# Patient Record
Sex: MALE | Race: BLACK OR AFRICAN AMERICAN | URBAN - METROPOLITAN AREA
[De-identification: names, ages, dates, MRNs, and addresses within clinical notes are randomized per-mention and may not be internally consistent; named-entity substitution may affect disease eponyms.]

---

## 2022-02-21 ENCOUNTER — HOSPITAL ENCOUNTER (INPATIENT)
Age: 62
LOS: 1 days | Discharge: HOME | DRG: 280 | End: 2022-02-22
Attending: EMERGENCY MEDICINE | Admitting: INTERNAL MEDICINE
Payer: OTHER GOVERNMENT

## 2022-02-21 ENCOUNTER — APPOINTMENT (OUTPATIENT)
Dept: GENERAL RADIOLOGY | Age: 62
DRG: 280 | End: 2022-02-21
Payer: OTHER GOVERNMENT

## 2022-02-21 DIAGNOSIS — R09.02 HYPOXIA: ICD-10-CM

## 2022-02-21 DIAGNOSIS — R07.9 CHEST PAIN, UNSPECIFIED TYPE: Primary | ICD-10-CM

## 2022-02-21 DIAGNOSIS — N18.6 ESRD (END STAGE RENAL DISEASE) (HCC): ICD-10-CM

## 2022-02-21 PROBLEM — J81.0 PULMONARY EDEMA, ACUTE (HCC): Status: ACTIVE | Noted: 2022-02-21

## 2022-02-21 LAB
AMPHETAMINE SCREEN, URINE: ABNORMAL
ANION GAP SERPL CALCULATED.3IONS-SCNC: 18 MMOL/L (ref 3–16)
BARBITURATE SCREEN URINE: ABNORMAL
BASE EXCESS VENOUS: -0.1 MMOL/L (ref -2–3)
BASOPHILS ABSOLUTE: 0.1 K/UL (ref 0–0.2)
BASOPHILS RELATIVE PERCENT: 0.8 %
BENZODIAZEPINE SCREEN, URINE: ABNORMAL
BUN BLDV-MCNC: 52 MG/DL (ref 7–20)
CALCIUM SERPL-MCNC: 9.8 MG/DL (ref 8.3–10.6)
CANNABINOID SCREEN URINE: ABNORMAL
CARBOXYHEMOGLOBIN: 1.3 % (ref 0–1.5)
CHLORIDE BLD-SCNC: 99 MMOL/L (ref 99–110)
CO2: 22 MMOL/L (ref 21–32)
COCAINE METABOLITE SCREEN URINE: ABNORMAL
CREAT SERPL-MCNC: 9.3 MG/DL (ref 0.8–1.3)
EKG ATRIAL RATE: 107 BPM
EKG DIAGNOSIS: NORMAL
EKG P AXIS: 56 DEGREES
EKG P-R INTERVAL: 176 MS
EKG Q-T INTERVAL: 346 MS
EKG QRS DURATION: 102 MS
EKG QTC CALCULATION (BAZETT): 461 MS
EKG R AXIS: 32 DEGREES
EKG T AXIS: -21 DEGREES
EKG VENTRICULAR RATE: 107 BPM
EOSINOPHILS ABSOLUTE: 0.4 K/UL (ref 0–0.6)
EOSINOPHILS RELATIVE PERCENT: 2.8 %
GFR AFRICAN AMERICAN: 7
GFR NON-AFRICAN AMERICAN: 6
GLUCOSE BLD-MCNC: 114 MG/DL (ref 70–99)
HCO3 VENOUS: 24.6 MMOL/L (ref 24–28)
HCT VFR BLD CALC: 30.1 % (ref 40.5–52.5)
HEMOGLOBIN, VEN, REDUCED: 3 %
HEMOGLOBIN: 9.7 G/DL (ref 13.5–17.5)
HEPATITIS B SURFACE ANTIGEN INTERPRETATION: NORMAL
LYMPHOCYTES ABSOLUTE: 2.3 K/UL (ref 1–5.1)
LYMPHOCYTES RELATIVE PERCENT: 15.9 %
Lab: ABNORMAL
MAGNESIUM: 2.3 MG/DL (ref 1.8–2.4)
MCH RBC QN AUTO: 30.2 PG (ref 26–34)
MCHC RBC AUTO-ENTMCNC: 32.3 G/DL (ref 31–36)
MCV RBC AUTO: 93.5 FL (ref 80–100)
METHADONE SCREEN, URINE: ABNORMAL
METHEMOGLOBIN VENOUS: 0.3 % (ref 0–1.5)
MONOCYTES ABSOLUTE: 1.6 K/UL (ref 0–1.3)
MONOCYTES RELATIVE PERCENT: 11 %
NEUTROPHILS ABSOLUTE: 10.1 K/UL (ref 1.7–7.7)
NEUTROPHILS RELATIVE PERCENT: 69.5 %
O2 SAT, VEN: 97 %
OPIATE SCREEN URINE: ABNORMAL
OXYCODONE URINE: POSITIVE
PCO2, VEN: 38.9 MMHG (ref 41–51)
PDW BLD-RTO: 16.3 % (ref 12.4–15.4)
PH UA: 7
PH VENOUS: 7.41 (ref 7.35–7.45)
PHENCYCLIDINE SCREEN URINE: ABNORMAL
PHOSPHORUS: 5.8 MG/DL (ref 2.5–4.9)
PLATELET # BLD: 346 K/UL (ref 135–450)
PMV BLD AUTO: 7.5 FL (ref 5–10.5)
PO2, VEN: 85.5 MMHG (ref 25–40)
POTASSIUM SERPL-SCNC: 4.9 MMOL/L (ref 3.5–5.1)
PRO-BNP: 7182 PG/ML (ref 0–124)
PROPOXYPHENE SCREEN: ABNORMAL
RBC # BLD: 3.22 M/UL (ref 4.2–5.9)
SODIUM BLD-SCNC: 139 MMOL/L (ref 136–145)
TCO2 CALC VENOUS: 26 MMOL/L
TROPONIN: 0.12 NG/ML
TROPONIN: 0.13 NG/ML
WBC # BLD: 14.6 K/UL (ref 4–11)

## 2022-02-21 PROCEDURE — 83880 ASSAY OF NATRIURETIC PEPTIDE: CPT

## 2022-02-21 PROCEDURE — 94761 N-INVAS EAR/PLS OXIMETRY MLT: CPT

## 2022-02-21 PROCEDURE — 84100 ASSAY OF PHOSPHORUS: CPT

## 2022-02-21 PROCEDURE — 71045 X-RAY EXAM CHEST 1 VIEW: CPT

## 2022-02-21 PROCEDURE — 6370000000 HC RX 637 (ALT 250 FOR IP)

## 2022-02-21 PROCEDURE — 2580000003 HC RX 258: Performed by: INTERNAL MEDICINE

## 2022-02-21 PROCEDURE — 83735 ASSAY OF MAGNESIUM: CPT

## 2022-02-21 PROCEDURE — 99223 1ST HOSP IP/OBS HIGH 75: CPT | Performed by: INTERNAL MEDICINE

## 2022-02-21 PROCEDURE — 93005 ELECTROCARDIOGRAM TRACING: CPT | Performed by: EMERGENCY MEDICINE

## 2022-02-21 PROCEDURE — 80307 DRUG TEST PRSMV CHEM ANLYZR: CPT

## 2022-02-21 PROCEDURE — 85025 COMPLETE CBC W/AUTO DIFF WBC: CPT

## 2022-02-21 PROCEDURE — 90935 HEMODIALYSIS ONE EVALUATION: CPT

## 2022-02-21 PROCEDURE — C9113 INJ PANTOPRAZOLE SODIUM, VIA: HCPCS | Performed by: INTERNAL MEDICINE

## 2022-02-21 PROCEDURE — 5A1D70Z PERFORMANCE OF URINARY FILTRATION, INTERMITTENT, LESS THAN 6 HOURS PER DAY: ICD-10-PCS | Performed by: INTERNAL MEDICINE

## 2022-02-21 PROCEDURE — 6360000002 HC RX W HCPCS: Performed by: INTERNAL MEDICINE

## 2022-02-21 PROCEDURE — 87340 HEPATITIS B SURFACE AG IA: CPT

## 2022-02-21 PROCEDURE — 80048 BASIC METABOLIC PNL TOTAL CA: CPT

## 2022-02-21 PROCEDURE — 2700000000 HC OXYGEN THERAPY PER DAY

## 2022-02-21 PROCEDURE — 6370000000 HC RX 637 (ALT 250 FOR IP): Performed by: INTERNAL MEDICINE

## 2022-02-21 PROCEDURE — 96375 TX/PRO/DX INJ NEW DRUG ADDON: CPT

## 2022-02-21 PROCEDURE — 82803 BLOOD GASES ANY COMBINATION: CPT

## 2022-02-21 PROCEDURE — 94660 CPAP INITIATION&MGMT: CPT

## 2022-02-21 PROCEDURE — 84484 ASSAY OF TROPONIN QUANT: CPT

## 2022-02-21 PROCEDURE — 6360000002 HC RX W HCPCS: Performed by: EMERGENCY MEDICINE

## 2022-02-21 PROCEDURE — 99285 EMERGENCY DEPT VISIT HI MDM: CPT

## 2022-02-21 PROCEDURE — 2500000003 HC RX 250 WO HCPCS: Performed by: EMERGENCY MEDICINE

## 2022-02-21 PROCEDURE — 2060000000 HC ICU INTERMEDIATE R&B

## 2022-02-21 PROCEDURE — 96374 THER/PROPH/DIAG INJ IV PUSH: CPT

## 2022-02-21 RX ORDER — ACETAMINOPHEN 325 MG/1
650 TABLET ORAL EVERY 6 HOURS PRN
Status: DISCONTINUED | OUTPATIENT
Start: 2022-02-21 | End: 2022-02-22 | Stop reason: HOSPADM

## 2022-02-21 RX ORDER — NITROGLYCERIN 0.4 MG/1
0.4 TABLET SUBLINGUAL EVERY 5 MIN PRN
COMMUNITY

## 2022-02-21 RX ORDER — HYDRALAZINE HYDROCHLORIDE 100 MG/1
100 TABLET, FILM COATED ORAL EVERY 8 HOURS SCHEDULED
Status: DISCONTINUED | OUTPATIENT
Start: 2022-02-21 | End: 2022-02-22 | Stop reason: HOSPADM

## 2022-02-21 RX ORDER — CARVEDILOL 25 MG/1
25 TABLET ORAL 2 TIMES DAILY WITH MEALS
Status: DISCONTINUED | OUTPATIENT
Start: 2022-02-21 | End: 2022-02-22 | Stop reason: HOSPADM

## 2022-02-21 RX ORDER — CLOPIDOGREL BISULFATE 75 MG/1
75 TABLET ORAL DAILY
COMMUNITY

## 2022-02-21 RX ORDER — ASPIRIN 81 MG/1
81 TABLET ORAL DAILY
Status: DISCONTINUED | OUTPATIENT
Start: 2022-02-21 | End: 2022-02-22 | Stop reason: HOSPADM

## 2022-02-21 RX ORDER — CLOPIDOGREL BISULFATE 75 MG/1
75 TABLET ORAL DAILY
Status: DISCONTINUED | OUTPATIENT
Start: 2022-02-21 | End: 2022-02-22 | Stop reason: HOSPADM

## 2022-02-21 RX ORDER — ONDANSETRON 4 MG/1
4 TABLET, ORALLY DISINTEGRATING ORAL EVERY 8 HOURS PRN
Status: DISCONTINUED | OUTPATIENT
Start: 2022-02-21 | End: 2022-02-22 | Stop reason: HOSPADM

## 2022-02-21 RX ORDER — HYDRALAZINE HYDROCHLORIDE 100 MG/1
100 TABLET, FILM COATED ORAL 3 TIMES DAILY
COMMUNITY

## 2022-02-21 RX ORDER — SODIUM CHLORIDE 9 MG/ML
25 INJECTION, SOLUTION INTRAVENOUS PRN
Status: CANCELLED | OUTPATIENT
Start: 2022-02-21

## 2022-02-21 RX ORDER — ONDANSETRON 2 MG/ML
4 INJECTION INTRAMUSCULAR; INTRAVENOUS EVERY 6 HOURS PRN
Status: DISCONTINUED | OUTPATIENT
Start: 2022-02-21 | End: 2022-02-22 | Stop reason: HOSPADM

## 2022-02-21 RX ORDER — NITROGLYCERIN 20 MG/100ML
5-200 INJECTION INTRAVENOUS CONTINUOUS
Status: DISCONTINUED | OUTPATIENT
Start: 2022-02-21 | End: 2022-02-21

## 2022-02-21 RX ORDER — TAMSULOSIN HYDROCHLORIDE 0.4 MG/1
0.8 CAPSULE ORAL DAILY
Status: DISCONTINUED | OUTPATIENT
Start: 2022-02-21 | End: 2022-02-22 | Stop reason: HOSPADM

## 2022-02-21 RX ORDER — ACETAMINOPHEN 650 MG/1
650 SUPPOSITORY RECTAL EVERY 6 HOURS PRN
Status: CANCELLED | OUTPATIENT
Start: 2022-02-21

## 2022-02-21 RX ORDER — GUAIFENESIN 600 MG/1
1200 TABLET, EXTENDED RELEASE ORAL 2 TIMES DAILY
COMMUNITY

## 2022-02-21 RX ORDER — MULTIVITAMIN WITH IRON
1 TABLET ORAL DAILY
COMMUNITY

## 2022-02-21 RX ORDER — ACETAMINOPHEN 500 MG
1000 TABLET ORAL EVERY 8 HOURS PRN
COMMUNITY

## 2022-02-21 RX ORDER — BIOTIN 10 MG
1 TABLET ORAL DAILY
COMMUNITY

## 2022-02-21 RX ORDER — SODIUM CHLORIDE 0.9 % (FLUSH) 0.9 %
5-40 SYRINGE (ML) INJECTION EVERY 12 HOURS SCHEDULED
Status: DISCONTINUED | OUTPATIENT
Start: 2022-02-21 | End: 2022-02-22 | Stop reason: HOSPADM

## 2022-02-21 RX ORDER — ONDANSETRON 2 MG/ML
4 INJECTION INTRAMUSCULAR; INTRAVENOUS ONCE
Status: COMPLETED | OUTPATIENT
Start: 2022-02-21 | End: 2022-02-21

## 2022-02-21 RX ORDER — SODIUM CHLORIDE 0.9 % (FLUSH) 0.9 %
5-40 SYRINGE (ML) INJECTION PRN
Status: CANCELLED | OUTPATIENT
Start: 2022-02-21

## 2022-02-21 RX ORDER — POLYETHYLENE GLYCOL 3350 17 G/17G
17 POWDER, FOR SOLUTION ORAL DAILY PRN
Status: CANCELLED | OUTPATIENT
Start: 2022-02-21

## 2022-02-21 RX ORDER — ASPIRIN 81 MG/1
81 TABLET ORAL DAILY
COMMUNITY

## 2022-02-21 RX ORDER — ONDANSETRON 2 MG/ML
4 INJECTION INTRAMUSCULAR; INTRAVENOUS EVERY 6 HOURS PRN
Status: CANCELLED | OUTPATIENT
Start: 2022-02-21

## 2022-02-21 RX ORDER — ISOSORBIDE MONONITRATE 60 MG/1
60 TABLET, EXTENDED RELEASE ORAL DAILY
Status: DISCONTINUED | OUTPATIENT
Start: 2022-02-21 | End: 2022-02-22 | Stop reason: HOSPADM

## 2022-02-21 RX ORDER — SODIUM CHLORIDE 0.9 % (FLUSH) 0.9 %
5-40 SYRINGE (ML) INJECTION PRN
Status: DISCONTINUED | OUTPATIENT
Start: 2022-02-21 | End: 2022-02-22 | Stop reason: HOSPADM

## 2022-02-21 RX ORDER — ACETAMINOPHEN 650 MG/1
650 SUPPOSITORY RECTAL EVERY 6 HOURS PRN
Status: DISCONTINUED | OUTPATIENT
Start: 2022-02-21 | End: 2022-02-22 | Stop reason: HOSPADM

## 2022-02-21 RX ORDER — FOLIC ACID 1 MG/1
1 TABLET ORAL DAILY
COMMUNITY

## 2022-02-21 RX ORDER — NITROGLYCERIN 0.4 MG/1
1.2 TABLET SUBLINGUAL ONCE
Status: COMPLETED | OUTPATIENT
Start: 2022-02-21 | End: 2022-02-21

## 2022-02-21 RX ORDER — HEPARIN SODIUM 5000 [USP'U]/ML
5000 INJECTION, SOLUTION INTRAVENOUS; SUBCUTANEOUS EVERY 8 HOURS SCHEDULED
Status: CANCELLED | OUTPATIENT
Start: 2022-02-21

## 2022-02-21 RX ORDER — TORSEMIDE 20 MG/1
20 TABLET ORAL DAILY
COMMUNITY

## 2022-02-21 RX ORDER — ATORVASTATIN CALCIUM 80 MG/1
80 TABLET, FILM COATED ORAL EVERY EVENING
COMMUNITY

## 2022-02-21 RX ORDER — ONDANSETRON 4 MG/1
4 TABLET, ORALLY DISINTEGRATING ORAL EVERY 8 HOURS PRN
Status: CANCELLED | OUTPATIENT
Start: 2022-02-21

## 2022-02-21 RX ORDER — SODIUM CHLORIDE 0.9 % (FLUSH) 0.9 %
5-40 SYRINGE (ML) INJECTION EVERY 12 HOURS SCHEDULED
Status: CANCELLED | OUTPATIENT
Start: 2022-02-21

## 2022-02-21 RX ORDER — CETIRIZINE HYDROCHLORIDE 5 MG/1
5 TABLET ORAL EVERY OTHER DAY
COMMUNITY

## 2022-02-21 RX ORDER — TAMSULOSIN HYDROCHLORIDE 0.4 MG/1
0.8 CAPSULE ORAL DAILY
COMMUNITY

## 2022-02-21 RX ORDER — NIFEDIPINE 60 MG/1
60 TABLET, EXTENDED RELEASE ORAL 2 TIMES DAILY
COMMUNITY

## 2022-02-21 RX ORDER — NIFEDIPINE 60 MG/1
60 TABLET, EXTENDED RELEASE ORAL 2 TIMES DAILY
Status: DISCONTINUED | OUTPATIENT
Start: 2022-02-21 | End: 2022-02-21

## 2022-02-21 RX ORDER — OMEPRAZOLE 20 MG/1
20 CAPSULE, DELAYED RELEASE ORAL DAILY
COMMUNITY

## 2022-02-21 RX ORDER — ACETAMINOPHEN 325 MG/1
650 TABLET ORAL EVERY 6 HOURS PRN
Status: CANCELLED | OUTPATIENT
Start: 2022-02-21

## 2022-02-21 RX ORDER — NIFEDIPINE 30 MG/1
60 TABLET, FILM COATED, EXTENDED RELEASE ORAL 2 TIMES DAILY
Status: DISCONTINUED | OUTPATIENT
Start: 2022-02-21 | End: 2022-02-22 | Stop reason: HOSPADM

## 2022-02-21 RX ORDER — FLUTICASONE PROPIONATE 50 MCG
1 SPRAY, SUSPENSION (ML) NASAL DAILY
COMMUNITY

## 2022-02-21 RX ORDER — TORSEMIDE 20 MG/1
20 TABLET ORAL DAILY
Status: DISCONTINUED | OUTPATIENT
Start: 2022-02-22 | End: 2022-02-22 | Stop reason: HOSPADM

## 2022-02-21 RX ORDER — CARVEDILOL 25 MG/1
25 TABLET ORAL 2 TIMES DAILY WITH MEALS
COMMUNITY

## 2022-02-21 RX ORDER — SODIUM CHLORIDE 9 MG/ML
25 INJECTION, SOLUTION INTRAVENOUS PRN
Status: DISCONTINUED | OUTPATIENT
Start: 2022-02-21 | End: 2022-02-22 | Stop reason: HOSPADM

## 2022-02-21 RX ORDER — HEPARIN SODIUM 5000 [USP'U]/ML
5000 INJECTION, SOLUTION INTRAVENOUS; SUBCUTANEOUS EVERY 8 HOURS SCHEDULED
Status: DISCONTINUED | OUTPATIENT
Start: 2022-02-21 | End: 2022-02-22 | Stop reason: HOSPADM

## 2022-02-21 RX ORDER — PANTOPRAZOLE SODIUM 40 MG/10ML
40 INJECTION, POWDER, LYOPHILIZED, FOR SOLUTION INTRAVENOUS 2 TIMES DAILY
Status: DISCONTINUED | OUTPATIENT
Start: 2022-02-21 | End: 2022-02-22 | Stop reason: HOSPADM

## 2022-02-21 RX ORDER — FUROSEMIDE 10 MG/ML
80 INJECTION INTRAMUSCULAR; INTRAVENOUS ONCE
Status: COMPLETED | OUTPATIENT
Start: 2022-02-21 | End: 2022-02-21

## 2022-02-21 RX ORDER — NITROGLYCERIN 0.4 MG/1
TABLET SUBLINGUAL
Status: COMPLETED
Start: 2022-02-21 | End: 2022-02-21

## 2022-02-21 RX ADMIN — NIFEDIPINE 60 MG: 30 TABLET, EXTENDED RELEASE ORAL at 10:45

## 2022-02-21 RX ADMIN — CLOPIDOGREL BISULFATE 75 MG: 75 TABLET ORAL at 10:14

## 2022-02-21 RX ADMIN — FUROSEMIDE 80 MG: 10 INJECTION, SOLUTION INTRAMUSCULAR; INTRAVENOUS at 10:12

## 2022-02-21 RX ADMIN — HYDRALAZINE HYDROCHLORIDE 100 MG: 100 TABLET ORAL at 17:19

## 2022-02-21 RX ADMIN — NITROGLYCERIN 1.2 MG: 0.4 TABLET SUBLINGUAL at 07:07

## 2022-02-21 RX ADMIN — PANTOPRAZOLE SODIUM 40 MG: 40 INJECTION, POWDER, LYOPHILIZED, FOR SOLUTION INTRAVENOUS at 21:29

## 2022-02-21 RX ADMIN — SODIUM CHLORIDE, PRESERVATIVE FREE 10 ML: 5 INJECTION INTRAVENOUS at 21:45

## 2022-02-21 RX ADMIN — CARVEDILOL 25 MG: 25 TABLET, FILM COATED ORAL at 17:19

## 2022-02-21 RX ADMIN — NITROGLYCERIN 5 MCG/MIN: 20 INJECTION INTRAVENOUS at 07:21

## 2022-02-21 RX ADMIN — PANTOPRAZOLE SODIUM 40 MG: 40 INJECTION, POWDER, LYOPHILIZED, FOR SOLUTION INTRAVENOUS at 12:10

## 2022-02-21 RX ADMIN — ISOSORBIDE MONONITRATE 60 MG: 60 TABLET, EXTENDED RELEASE ORAL at 10:45

## 2022-02-21 RX ADMIN — SODIUM CHLORIDE, PRESERVATIVE FREE 10 ML: 5 INJECTION INTRAVENOUS at 10:00

## 2022-02-21 RX ADMIN — HEPARIN SODIUM 5000 UNITS: 5000 INJECTION INTRAVENOUS; SUBCUTANEOUS at 21:45

## 2022-02-21 RX ADMIN — ONDANSETRON 4 MG: 2 INJECTION INTRAMUSCULAR; INTRAVENOUS at 06:51

## 2022-02-21 RX ADMIN — CARVEDILOL 25 MG: 25 TABLET, FILM COATED ORAL at 12:46

## 2022-02-21 RX ADMIN — TAMSULOSIN HYDROCHLORIDE 0.8 MG: 0.4 CAPSULE ORAL at 10:13

## 2022-02-21 RX ADMIN — NIFEDIPINE 60 MG: 30 TABLET, EXTENDED RELEASE ORAL at 21:30

## 2022-02-21 RX ADMIN — HYDRALAZINE HYDROCHLORIDE 100 MG: 100 TABLET ORAL at 21:30

## 2022-02-21 RX ADMIN — HEPARIN SODIUM 5000 UNITS: 5000 INJECTION INTRAVENOUS; SUBCUTANEOUS at 17:19

## 2022-02-21 RX ADMIN — ASPIRIN 81 MG: 81 TABLET, COATED ORAL at 10:13

## 2022-02-21 RX ADMIN — ONDANSETRON 4 MG: 2 INJECTION INTRAMUSCULAR; INTRAVENOUS at 10:21

## 2022-02-21 ASSESSMENT — PAIN SCALES - GENERAL
PAINLEVEL_OUTOF10: 0
PAINLEVEL_OUTOF10: 0
PAINLEVEL_OUTOF10: 3
PAINLEVEL_OUTOF10: 0
PAINLEVEL_OUTOF10: 3
PAINLEVEL_OUTOF10: 0
PAINLEVEL_OUTOF10: 0

## 2022-02-21 ASSESSMENT — ENCOUNTER SYMPTOMS
EYES NEGATIVE: 1
GASTROINTESTINAL NEGATIVE: 1
SHORTNESS OF BREATH: 1

## 2022-02-21 ASSESSMENT — PAIN DESCRIPTION - DESCRIPTORS
DESCRIPTORS: PRESSURE
DESCRIPTORS: DISCOMFORT

## 2022-02-21 ASSESSMENT — PAIN DESCRIPTION - PAIN TYPE
TYPE: ACUTE PAIN
TYPE: ACUTE PAIN

## 2022-02-21 ASSESSMENT — PAIN DESCRIPTION - FREQUENCY: FREQUENCY: CONTINUOUS

## 2022-02-21 ASSESSMENT — PAIN DESCRIPTION - LOCATION
LOCATION: CHEST
LOCATION: CHEST

## 2022-02-21 ASSESSMENT — PAIN - FUNCTIONAL ASSESSMENT: PAIN_FUNCTIONAL_ASSESSMENT: 0-10

## 2022-02-21 ASSESSMENT — PAIN DESCRIPTION - PROGRESSION: CLINICAL_PROGRESSION: RAPIDLY IMPROVING

## 2022-02-21 ASSESSMENT — PAIN DESCRIPTION - ONSET: ONSET: ON-GOING

## 2022-02-21 NOTE — ED NOTES
Perfect serve message sent to Dr. Terrall Mohs regarding recent hx of coffee ground emesis and pt also states he takes Omeprazole which hasn't been ordered.  Waiting for reply from MD Adrienne Mcgowan RN  02/21/22 1037

## 2022-02-21 NOTE — ED NOTES
Pt pulling off bipap mask stating he can't breath with it on. Dr. Royce Teixeira to bedside and requested RT come to reduce pressures so pt tolerates it more.  RT came to bedside and adjusted bipap     [place] , GAMAL  02/21/22 1655

## 2022-02-21 NOTE — CONSULTS
[address]         Reason for Consultation/Chief Complaint: \"chest pain, hx of [facility]\"/\"I have been having left-sided chest pain. \"       History of Present Illness:  Jada Ochoa is a 64 y.o. patient who presented to the hospital with complaints of new onset left-sided chest pain and shortness of breath. Patient states left-sided chest pain began when he woke and radiated down his left arm with associated numbness. Patient also felt diaphoretic and attempted to improve his symptoms with 3 nitroglycerin which were not effective. Patient was also found to be 89% on room air and reportedly been having intermittent episodes of chest pain for several days. Upon presentation the emergency department he was found to be severely hypertensive with a systolic of 871/7 tens and was placed on a nitro drip as well as BiPAP. proBNP was found to be elevated to 7000 and patient also had an slightly elevated troponin of 0.12. Additionally, he does have a history of ESRD with a creatinine of 9.3. EKG demonstrates sinus tachycardia without new ST segment abnormalities. Patient states he has been getting his normal dialysis appointments, however it does appear that during a previous session he had less fluid removed than usual.  Patient admitted for hypertensive emergency and treated pharmacologically and is also now currently receiving emergent dialysis. Cardiology consulted for his troponin in the setting of his previous CAD. Past Medical History:   has a past medical history of CAD (coronary artery disease), Chronic kidney disease, COPD (chronic obstructive pulmonary disease) ([address] End stage kidney disease ([facility] [address] [address] Hyperlipidemia, Hypertension, Kidney stone, MI (myocardial infarction) ([address] Throat cancer ([address] and Walking pneumonia.     Surgical History:   has a past surgical history that includes Dialysis fistula creation; IR NONTUNNELED VASCULAR CATHETER > 5 YEARS; Coronary angioplasty with stent; Cervical spine surgery (2010); knee surgery; Carpal tunnel release; and shoulder surgery. Social History:   reports that he quit smoking about 3 years ago. His smoking use included cigarettes. He smoked 1.00 pack per day. He has never used smokeless tobacco. He reports previous alcohol use. He reports previous drug use. Drug: Cocaine. Family History:  No evidence for sudden cardiac death or premature CAD    Home Medications:  Were reviewed and are listed in nursing record. and/or listed below  Prior to Admission medications    Medication Sig Start Date End Date Taking?  Authorizing Provider   aspirin 81 MG EC tablet Take 81 mg by mouth daily   Yes Historical Provider, MD   clopidogrel (PLAVIX) 75 MG tablet Take 75 mg by mouth daily   Yes Historical Provider, MD   NIFEdipine (PROCARDIA XL) 60 MG extended release tablet Take 60 mg by mouth 2 times daily    Yes Historical Provider, MD   hydrALAZINE (APRESOLINE) 100 MG tablet Take 100 mg by mouth 3 times daily    Yes Historical Provider, MD   atorvastatin (LIPITOR) 80 MG tablet Take 80 mg by mouth every evening    Yes Historical Provider, MD   acetaminophen (TYLENOL) 500 MG tablet Take 1,000 mg by mouth every 8 hours as needed for Pain Max 3000mg/day   Yes Historical Provider, MD   B-Complex-C TABS Take 1 tablet by mouth daily   Yes Historical Provider, MD   carvedilol (COREG) 25 MG tablet Take 25 mg by mouth 2 times daily (with meals)   Yes Historical Provider, MD   cetirizine (ZYRTEC) 5 MG tablet Take 5 mg by mouth every other day   Yes Historical Provider, MD   vitamin D (CHOLECALCIFEROL) 25 MCG (1000 UT) TABS tablet Take 1,000 Units by mouth daily   Yes Historical Provider, MD   omeprazole (PRILOSEC) 20 MG delayed release capsule Take 20 mg by mouth daily On an empty stomach   Yes Historical Provider, MD   diclofenac sodium (VOLTAREN) 1 % GEL Apply 2 g topically 4 times daily as needed for Pain   Yes Historical Provider, MD   tamsulosin (FLOMAX) 0.4 MG capsule Take 0.8 mg by mouth daily   Yes Historical Provider, MD   fluticasone (FLONASE) 50 MCG/ACT nasal spray 1 spray by Each Nostril route daily   Yes Historical Provider, MD   folic acid (FOLVITE) 1 MG tablet Take 1 mg by mouth daily   Yes Historical Provider, MD   guaiFENesin (MUCINEX) 600 MG extended release tablet Take 1,200 mg by mouth 2 times daily With a full glass of water for cough   Yes Historical Provider, MD   Multiple Vitamins-Minerals (MULTIVITAMIN ADULT) CHEW Take 1 tablet by mouth daily   Yes Historical Provider, MD   nitroGLYCERIN (NITROSTAT) 0.4 MG SL tablet Place 0.4 mg under the tongue every 5 minutes as needed for Chest pain up to max of 3 total doses. If no relief after 1 dose, call 911. Yes Historical Provider, MD   torsemide (DEMADEX) 20 MG tablet Take 20 mg by mouth daily   Yes Historical Provider, MD        Hospital Medications:   aspirin  81 mg Oral Daily    clopidogrel  75 mg Oral Daily    sodium chloride flush  5-40 mL IntraVENous 2 times per day    heparin (porcine)  5,000 Units SubCUTAneous 3 times per day    carvedilol  25 mg Oral BID WC    tamsulosin  0.8 mg Oral Daily    hydrALAZINE  100 mg Oral 3 times per day    isosorbide mononitrate  60 mg Oral Daily    NIFEdipine  60 mg Oral BID    pantoprazole  40 mg IntraVENous BID     sodium chloride flush, sodium chloride, ondansetron **OR** ondansetron, acetaminophen **OR** acetaminophen   sodium chloride         Allergies:  Ibuprofen     Review of Systems:     A 14 ROS obtained and negative except as mentioned in HPI. · Constitutional: there has been no unanticipated weight loss. · Eyes: No visual changes or diplopia. No scleral icterus. · ENT: No Headaches, hearing loss or vertigo. No mouth sores or sore throat. · Cardiovascular: No loss of consciousness. No hemoptysis, pleuritic pain, or phlebitis. · Respiratory: No cough or wheezing, no sputum production. No hematemesis. · Gastrointestinal: No abdominal pain, appetite loss, blood in stools. No change in bowel or bladder habits. · Genitourinary: No dysuria, or hematuria. · Musculoskeletal:  No gait disturbance, weakness or joint complaints. · Integumentary: No rash or pruritis. · Neurological: No headache, diplopia,numbness or tingling. No change in gait, balance, coordination, mood, affect, memory, mentation, behavior. · Psychiatric: No anxiety,  · Endocrine: No malaise,  · Hematologic/Lymphatic: No abnormal bruising  · Allergic/Immunologic: No nasal congestion      Physical Examination:    Vitals:    02/21/22 1243   BP:    Pulse:    Resp:    Temp:    SpO2: 100%    Weight: 174 lb 3.2 oz (79 kg)         General Appearance:  Alert, cooperative, no distress, appears stated age. Currently receiving dialysis. Head:  Normocephalic, without obvious abnormality, atraumatic   Eyes:  PERRL   Nose: Nares normal,   Neck: Supple, JVP normal    Lungs:   Clear to auscultation bilaterally, currently on NRB   Chest Wall:  No tenderness or deformity   Heart:  Regular rate and rhythm, normal S1, S2 normal, no murmur. No rub. No S3 / S4 gallop   Abdomen:   Soft, non-tender, +bowel sounds   Extremities: no cyanosis, no clubbing , Trace LE edema   Pulses: Symmetric 1+ in UE and LE extremities   Skin: no gross lesions or rashes   Pysch: Normal mood and affect   Neurologic: No gross deficits.   CN II - XII grossly intact        Labs  CBC:   Lab Results   Component Value Date    WBC 14.6 02/21/2022    RBC 3.22 02/21/2022    HGB 9.7 02/21/2022    HCT 30.1 02/21/2022    MCV 93.5 02/21/2022    RDW 16.3 02/21/2022     02/21/2022     CMP:    Lab Results   Component Value Date     02/21/2022    K 4.9 02/21/2022    CL 99 02/21/2022    CO2 22 02/21/2022    BUN 52 02/21/2022    CREATININE 9.3 02/21/2022    GFRAA 7 02/21/2022    LABGLOM 6 02/21/2022    GLUCOSE 114 02/21/2022    CALCIUM 9.8 02/21/2022     PT/INR:  No results found for: PTINR  Recent Labs     02/21/22  0348 02/21/22  0530   TROPONINI 0.12* 0.13*       EKG: Sinus Tachycardia w/ nonspecific T wave changes in lateral leads     Assessment & Plan  Patient Active Problem List   Diagnosis    Pulmonary edema, acute (HCC)       Impression:  1. Hypertensive Emergency c/b Pulmonary Edema  Hx of HTN  Patient has a history of hypertension and appears to have presented with a heavily blood pressure of 210/110. In addition, his troponin and creatinine were elevated which are likely symptoms of endorgan damage secondary to his elevated blood pressure. He was initially placed on a nitro drip and received emergent hemodialysis for fluid removal.  Patient is not currently complaining of left-sided chest pain similar to prior now that his blood pressure is better controlled. Patient initially required BiPAP however now has been able to be weaned to 10 L of oxygen with a nonrebreather mask ice is fluid overloaded state has been treated along with his blood pressure. 2. Chest Pain, Suspected Type 2 NSTEMI 2/2 #1. Patient initially presented with left-sided chest pain with radiation to his arm and mild troponin elevations with a history of previous stents at [facility]. He currently is not complaining of ongoing chest pain now that his blood pressure is controlled, and his EKG did not demonstrate acute signs of ischemia. Given his complicated past medical history and multiorgan dysfunction it is likely that his hypertensive episode triggered a demand ischemia. 3. CAD s/p Stents    4. HLD    Recommendations:  -HD per Nephro for fluid removal  -Continue Home HTN medications: Coreg, Hydralazine, Imdur, and Nifedipine  -If BP cannot be controlled with intermittent medications may need re-administration of nitro gtt.    -Does not need emergent cardiac catheterization at this time  -Trend Troponin's after HD   -Continue ASA and plavix given his previous CAD    I had the opportunity to review the clinical symptoms and presentation of Tiffanie Kenyon. Tobacco use was discussed with the patient and educated on the negative effects. I have asked the patient to not utilize these agents. Thank you for allowing to us to participate in the care or Tiffanie Kenyon. All questions and concerns were addressed to the patient/family. Alternatives to my treatment were discussed. The note was completed using EMR. Every effort was made to ensure accuracy; however, inadvertent computerized transcription errors may be present. Case to be staffed and discussed w/ . Milvia Spain MD. Nickolas Andrade to follow. Douglas Hill MD PGY-1  02/21/22  4:13 PM      Staff Note      Patient seen and evaluated with the medical resident. I was physically present during the critical portions of the service when performed by the resident including the assessment and management of the patient. Volume overload with demand ischemia. Very hypertensive. Continue supportive care and aggressive HD. No plan for angiography presently but can be revisited if needed. Add topical nitrates. I agree with the findings and plans as described.

## 2022-02-21 NOTE — CONSULTS
Thank you for considering [facility] Nephrology for inpatient consultation. Noted that the patient was recently seen and followed by [facility]      Please direct all renal related questions to  [facility] for this patient- [phone]     Informed:       Merline Bushman, MD  [facility] nephrology  [facility]  [phone]

## 2022-02-21 NOTE — H&P
Hospital Medicine History & Physical      PCP: No primary care provider on file. Date of Admission: 2/21/2022    Date of Service: Pt seen/examined on 02/21/22 and Admitted to Inpatient with expected LOS greater than two midnights due to medical therapy. Chief Complaint:  Chest pain, sob      History Of Present Illness:     64 y.o. male who has a history of chronic pain syndrome, hypertension, polysubstance abuse/cocaine use, last blood patient was used for years back, he has ESRD on hemodialysis Monday Wednesday Friday, CAD s/p stents in place, last done on Friday, followed by [address] to emergency room with complains of chest pain and shortness of breath  He woke up with left-sided chest pain radiated down to his left arm with numbness in the left hand associated with diaphoresis, this was followed by shortness of breath. He says he has been having intermittent episodes of chest pain for the last several days. He took 3 nitroglycerin with no improvement EMS was called who found him to be 89% on room air. In the emergency room work-up showed elevated blood pressure systolic 507/331E, placed on nitroglycerin drip and BiPAP. X-ray showed interstitial opacities concerning for pulmonary edema. Labs were proBNP of 7000, troponin of 0.12 4 x 0.13, BUN of 52 creatinine of 9.3. He has a leukocytosis of WBC 14.6 with left shift ANC 10.1. A VBG was done which did not show evidence of hypercarbia. EKG shows sinus tachycardia with ventricular to 107, normal axis, normal IL interval, no ST segment neurology, T wave flattening was seen, occasional PVCs.       Past Medical History:          Diagnosis Date    CAD (coronary artery disease)     Chronic kidney disease     dialysis patient Mon, Wed, and Friday    COPD ([facility] obstructive [facility] disease) [facility]     End stage kidney disease ([facility]     Hyperlipidemia     Hypertension     uncontrolled per pt    Kidney stone     [facility] infarction) [facility]     Throat cancer ([facility]     Walking pneumonia     end of Jan 2022       Past Surgical History:          Procedure Laterality Date    CARPAL TUNNEL RELEASE      left hand     CERVICAL SPINE SURGERY  2010    3 disc's crushed- surgery done- titanium in neck     CORONARY ANGIOPLASTY WITH STENT PLACEMENT      x 4    DIALYSIS FISTULA CREATION      left arm     IR NONTUNNELED VASCULAR CATHETER      right subclavian    KNEE SURGERY      left     SHOULDER SURGERY      left       Medications Prior to Admission:      Prior to Admission medications    Medication Sig Start Date End Date Taking? Authorizing Provider   aspirin 81 MG EC tablet Take 81 mg by mouth daily   Yes Historical Provider, MD   clopidogrel (PLAVIX) 75 MG tablet Take 75 mg by mouth daily   Yes Historical Provider, MD   NIFEdipine (PROCARDIA XL) 60 MG extended release tablet Take 60 mg by mouth 2 times daily ? Extended release or not   Yes Historical Provider, MD   HYDRALAZINE HCL PO Take by mouth 3 times daily ? Mg   Yes Historical Provider, MD   ISOSORBIDE PO Take 2 tablets by mouth daily ? Mg   Yes Historical Provider, MD   Atorvastatin Calcium (LIPITOR PO) Take by mouth every evening ? mg   Yes Historical Provider, MD   UNKNOWN TO PATIENT UNSURE OF ALL HIS HOME MEDS   Yes Historical Provider, MD       Allergies:  Ibuprofen    Social History:      The patient currently lives at home    TOBACCO:   reports that he quit smoking about 3 years ago. His smoking use included cigarettes. He smoked 1.00 pack per day. He has never used smokeless tobacco.  ETOH:   reports previous alcohol use. Family History:      Reviewed    History reviewed. No pertinent family history. REVIEW OF SYSTEMS:   Pertinent positives as noted in the HPI. All other systems reviewed and negative.     PHYSICAL EXAM PERFORMED:    BP (!) 200/95   Pulse 109   Temp 98.3 °F (36.8 °C) (Oral)   Resp 24   Wt 174 lb 3.2 oz (79 kg)   SpO2 100% General appearance:  No apparent distress, appears stated age and cooperative. HEENT:  Normal cephalic, atraumatic without obvious deformity. Pupils equal, round, and reactive to light. Extra ocular muscles intact. Conjunctivae/corneas clear. Neck: Supple, with full range of motion. No jugular venous distention. Trachea midline. Respiratory: On Bipap, bilateral decreased breath sound in the bases, faint crackles present  Cardiovascular: Tachycardia, referred sounds from the left upper extremity fistula   abdomen: Soft, non-tender, non-distended with normal bowel sounds. Musculoskeletal:  No clubbing, cyanosis or edema bilaterally. Full range of motion without deformity. Skin: Skin color, texture, turgor normal.  No rashes or lesions. Neurologic:  Neurovascularly intact without any focal sensory/motor deficits. Cranial nerves: II-XII intact, grossly non-focal.  Psychiatric:  Alert and oriented, thought content appropriate, normal insight  Capillary Refill: Brisk,< 3 seconds   Peripheral Pulses: +2 palpable, equal bilaterally       Labs:     Recent Labs     02/21/22  0348   WBC 14.6*   HGB 9.7*   HCT 30.1*        Recent Labs     02/21/22  0348      K 4.9   CL 99   CO2 22   BUN 52*   CREATININE 9.3*   CALCIUM 9.8   PHOS 5.8*     No results for input(s): AST, ALT, BILIDIR, BILITOT, ALKPHOS in the last 72 hours. No results for input(s): INR in the last 72 hours. Recent Labs     02/21/22  0348 02/21/22  0530   TROPONINI 0.12* 0.13*       Urinalysis:    No results found for: [place] [employer], BACTERIA, RBCUA, BLOODU, [address], GLUCOSEU    Radiology:     CXR: I have reviewed the CXR with the following interpretation: See HPI  EKG:  I have reviewed the EKG with the following interpretation: see HPI    XR CHEST PORTABLE   Final Result        Increased interstitial opacities bilaterally. No effusion. Mildly prominent heart size. Last TTE 10/30/2020   Study Conclusions     - Left ventricle:  The cavity size is normal. Wall thickness was increased in a pattern of severe     LVH. Systolic function was mildly reduced. The estimated ejection fraction was in the range of 45%     to 50%. Doppler parameters are consistent with abnormal left ventricular relaxation (grade 1     diastolic dysfunction). - Regional wall motion abnormality: Moderate hypokinesis of the basal inferior myocardium; mild     hypokinesis of the mid-apical inferior myocardium.   - Mitral valve: Mild regurgitation.   - Left atrium: The atrium is mildly dilated. - Right ventricle: Systolic function was normal by objective interpretation. TAPSE: 2.6cm.   - Pulmonary arteries: Systolic pressure could not be accurately estimated. ASSESSMENT/PLAN:    Active Hospital Problems    Diagnosis Date Noted    Pulmonary edema, acute ([facility] [J81.0] 02/21/2022     #Hypertensive emergency  #Pulmonary edema due to hypertensive emergency  -He is on multiple medications at home including carvedilol, nifedipine, Imdur, hydralazine, Lasix twice daily  -With history of cocaine use, initially Coreg was held although patient denied I did check a urine drug screen and in fact it was negative for cocaine  -Give 80 mg IV Lasix  -He was emergently taken for hemodialysis  -Monitor closely on telemetry, he did tell me that on Friday he only had a short run and only had 1.7 L of fluid removed  -After close monitoring for 6 hours I was able to wean patient off nitro drip and patient will be admitted to the progressive care unit rather than intensive care unit.     #Chest pain, concern for ACS, history of CAD with stents in place, initial troponin 0 0.12, follow-up 0.13.  -Demand ischemia versus NSTEMI, cardiology consulted for further evaluation recommendations.  -Continue aspirin, Plavix, high intensity statin  -Monitor on telemetry    Regarding cardiac Hx --   Last [facility] -- by Drea Adrian MD  @ [facility]  4 stents placed, 3 of which placed I severely stenosed RCA.  Last Echo -- 10/2020 -- as above    #ESRD needing hemodialysis for fluid removal  Chronic pain syndrome, unsure what is on, his urine drug screen was positive for oxycodone. #Black specks in sputum? Monitor for bleeding  Hemoglobin range of 9, this is close to his baseline  Start Protonix 40 mg IV twice daily  Okay for heparin subcu dose for now    DVT Prophylaxis: Heparin sq  Diet: Protonix  Code Status: Full Code    PT/OT Eval Status: evaluate daily, order if needed    Dispo - Admit as inpatient. I anticipate hospitalization spanning more than two midnights for investigation and treatment of the above medically necessary diagnoses. Pt has a high probability of imminent or life-threatening deterioration requiring close monitoring, and highly complex decision-making and/or interventions of high intensity to assess, manipulate, and support his critical organ systems to prevent the his inevitable decline which would occur if left untreated. A total critical care time 55 minutes spent, this includes but not limited to examining patient, collaborating with other physicians, monitoring vital signs, telemetry, and clinical response to IV medications; documentation time, review and interpretation of laboratory and radiological data, review of nursing notes and old record review. This time excludes any time that may have been spent performing procedures. Eliana Peters MD   Hospitalist    Thank you No primary care provider on file. for the opportunity to be involved in this patient's care. If you have any questions or concerns please feel free to contact me at [phone].

## 2022-02-21 NOTE — PLAN OF CARE
Patient seen and examined  He says his BP at home was 208/83, says on Friday they only took out 1.7 L which is less than his usual dialysis sessions. Hx of Cocaine abuse, per patient he has been clean x 4 yrs  On Nitro gtt and systolic still 469R  Give nifedipine, imdur, hydralazine  Give dose of IV Lasix 80 mg  Check UDS, if negative then give Coreg as well  Wean off Nitro gtt, for  or below and should be able to admit to PCU  Discussed with Dr. Marie Clifford, for for HD with fluid removal today  Continue BiPAP  Discussed with ER RN Jose Alberto Armstrong  Chart reviewed, [facility] and our epic pta medications not updated.  Pharmacy asked for med recs  Full H&P to follow    11:37 AM  Blood pressure still > 090 systolic while on Nitro gtt @ 50  Discussed with ICU resident, need to come to ICU for further management and get urgent HD    12:39 PM  Nitro gtt off, UDS negative for Cocaine so will give coreg  Discussed with ER and HD nurse to transfer patient to HD and thereafter will go to PCU not ICU

## 2022-02-21 NOTE — PLAN OF CARE
Problem: Pain:  Goal: Pain level will decrease  Description: Pain level will decrease  Outcome: Ongoing  Goal: Control of acute pain  Description: Control of acute pain  Outcome: Ongoing  Goal: Control of chronic pain  Description: Control of chronic pain  Outcome: Ongoing     Problem: Falls - Risk of:  Goal: Will remain free from falls  Description: Will remain free from falls  Outcome: Ongoing  Goal: Absence of physical injury  Description: Absence of physical injury  Outcome: Ongoing     Problem:  Activity:  Goal: Fatigue will decrease  Description: Fatigue will decrease  Outcome: Ongoing  Goal: Risk for activity intolerance will decrease  Description: Risk for activity intolerance will decrease  Outcome: Ongoing     Problem: Coping:  Goal: Ability to cope will improve  Description: Ability to cope will improve  Outcome: Ongoing     Problem: Fluid Volume:  Goal: Will show no signs or symptoms of fluid imbalance  Description: Will show no signs or symptoms of fluid imbalance  Outcome: Ongoing     Problem: Health Behavior:  Goal: Ability to manage health-related needs will improve  Description: Ability to manage health-related needs will improve  Outcome: Ongoing  Goal: Identification of resources available to assist in meeting health care needs will improve  Description: Identification of resources available to assist in meeting health care needs will improve  Outcome: Ongoing     Problem: Nutritional:  Goal: Ability to identify appropriate dietary choices will improve  Description: Ability to identify appropriate dietary choices will improve  Outcome: Ongoing     Problem: Physical Regulation:  Goal: Ability to maintain clinical measurements within normal limits will improve  Description: Ability to maintain clinical measurements within normal limits will improve  Outcome: Ongoing  Goal: Complications related to the disease process, condition or treatment will be avoided or minimized  Description: Complications related to the disease process, condition or treatment will be avoided or minimized  Outcome: Ongoing     Problem: Sensory:  Goal: General experience of comfort will improve  Description: General experience of comfort will improve  Outcome: Ongoing     Problem: Skin Integrity:  Goal: Status of oral mucous membranes will improve  Description: Status of oral mucous membranes will improve  Outcome: Ongoing  Goal: Skin integrity will be maintained  Description: Skin integrity will be maintained  Outcome: Ongoing  Goal: Will show no infection signs and symptoms  Description: Will show no infection signs and symptoms  Outcome: Ongoing  Goal: Absence of new skin breakdown  Description: Absence of new skin breakdown  Outcome: Ongoing

## 2022-02-21 NOTE — ED NOTES
rec'd from Dr. Felipe Zavala.  States pt will now be admitted to ICU     Sofiya Sheldon RN  02/21/22 0021

## 2022-02-21 NOTE — ED NOTES
Inpatient RN unable to take report.  States will call me back in 10 minutes     Dasha Cameron [facility]  02/21/22 9292

## 2022-02-21 NOTE — ED NOTES
Spoke with Dr. Julissa Thakkar. States to stop titrating the Nitroglycerin drip and to send pt to dialysis.  Also states do not administer nitropaste     Jayne Arreola RN  02/21/22 [address] [place] [address], RN  02/21/22 1200

## 2022-02-21 NOTE — PROGRESS NOTES
4 Eyes Admission Assessment     I agree as the admission nurse that 2 RN's have performed a thorough Head to Toe Skin Assessment on the patient. ALL assessment sites listed below have been assessed on admission. Areas assessed by both nurses:   [x]   Head, Face, and Ears   [x]   Shoulders, Back, and Chest  [x]   Arms, Elbows, and Hands   [x]   Coccyx, Sacrum, and Ischium  [x]   Legs, Feet, and Heels        Does the Patient have Skin Breakdown?   No         Juan Ramon Prevention initiated:  NA   Wound Care Orders initiated:  NA      WOC nurse consulted for Pressure Injury (Stage 3,4, Unstageable, DTI, NWPT, and Complex wounds) or Juan Ramon score 18 or lower:  NA      Nurse 1 eSignature: Electronically signed by Lena Freire RN on 2/21/22 at 6:49 PM EST    **SHARE this note so that the co-signing nurse is able to place an eSignature**    Nurse 2 eSignature: Electronically signed by Denys Golden RN on 2/21/22 at 6:33 PM EST

## 2022-02-21 NOTE — ED NOTES
Inpatient unit unable to take report at this time.  Requested I call back in 5 minutes     Estelle Medina, RN  02/21/22 2330

## 2022-02-21 NOTE — ED PROVIDER NOTES
[address] ENCOUNTER          ATTENDING PHYSICIAN NOTE       Date of evaluation: 2/21/2022    Chief Complaint     Chest Pain (started yesterday, increased tonight ) and Shortness of Breath (89% on RA per squad, HDU Mon, Wed, Friday)      History of Present Illness     Mercedes Coe is a 64 y.o. male who presents to the emergency department complaining of shortness of breath and chest pain. Patient states he has been having intermittent episodes of chest pain for the last several days. He states this morning, approximately an hour and a half prior to presentation, he woke him from sleep with a sensation of chest pain and shortness of breath. He states he took a total of 3 nitroglycerin at home with improvement of his symptoms. EMS did give the patient aspirin. He was noted by EMS to have oxygen saturations in the high 80s on room air so was placed on a nonrebreather mask. He states on arrival to the emergency department, his chest pain has resolved. He still does have some shortness of breath. He denies any recent fevers or chills. He denies any cough. He denies any nausea, vomiting, or diarrhea. He denies any swelling or pain in his extremities. He states the chest pain feels like a tightness in his chest and feels similar to when he had to have stents placed approximately a year ago. He does have a history of end-stage renal disease and is on [facility] hemodialysis and states he did have his dialysis session 2 days ago. Of note, patient did request to go to the [place] but EMS stated to him that he would just be transferred from there to another facility so brought him here instead. Patient states that if possible he would prefer to be admitted at the [place]. Review of Systems     Review of Systems   Constitutional: Negative. HENT: Negative. Eyes: Negative. Respiratory: Positive for shortness of breath. Cardiovascular: Positive for chest pain. Gastrointestinal: Negative. Genitourinary: Negative. Musculoskeletal: Negative. Neurological: Negative. All other systems reviewed and are negative. Past Medical, Surgical, Family, and Social History     He has a past medical history of CAD (coronary artery disease), Chronic kidney disease, COPD (chronic obstructive pulmonary disease) ([facility] [address] End stage kidney disease ([facility] [address] Hyperlipidemia, Hypertension, Kidney stone, MI (myocardial infarction) ([facility] [address] Throat cancer ([facility] [address] and Walking pneumonia. He has a past surgical history that includes Dialysis fistula creation; IR NONTUNNELED VASCULAR CATHETER > 5 YEARS; Coronary angioplasty with stent; Cervical spine surgery (2010); knee surgery; Carpal tunnel release; and shoulder surgery. His family history is not on file. He reports that he quit smoking about 3 years ago. His smoking use included cigarettes. He smoked 1.00 pack per day. He has never used smokeless tobacco. He reports previous alcohol use. He reports previous drug use. Drug: Cocaine. Medications     Current Discharge Medication List      CONTINUE these medications which have NOT CHANGED    Details   aspirin 81 MG EC tablet Take 81 mg by mouth daily      clopidogrel (PLAVIX) 75 MG tablet Take 75 mg by mouth daily      NIFEdipine (PROCARDIA XL) 60 MG extended release tablet Take 60 mg by mouth 2 times daily ? Extended release or not      HYDRALAZINE HCL PO Take by mouth 3 times daily ? Mg      ISOSORBIDE PO Take 2 tablets by mouth daily ? Mg      Atorvastatin Calcium (LIPITOR PO) Take by mouth every evening ? mg      UNKNOWN TO PATIENT UNSURE OF ALL HIS HOME MEDS             Allergies     He is allergic to ibuprofen. Physical Exam     INITIAL VITALS: BP: (!) 193/95, Temp: 98.3 °F (36.8 °C), Pulse: 106, Resp: 21, SpO2: 90 % (placed on 2 liters nc SPO2 up to 94%)   Physical Exam  Vitals and nursing note reviewed. Constitutional:       General: He is not in acute distress.   HENT: Head: Normocephalic and atraumatic. Mouth/Throat:      Mouth: Mucous membranes are moist.      Pharynx: No oropharyngeal exudate. Eyes:      General: No scleral icterus. Extraocular Movements: Extraocular movements intact. Conjunctiva/sclera: Conjunctivae normal.      Pupils: Pupils are equal, round, and reactive to light. Cardiovascular:      Rate and Rhythm: Regular rhythm. Tachycardia present. Heart sounds: Normal heart sounds. Pulmonary:      Effort: Pulmonary effort is normal.      Comments: Diminished breath sounds bilaterally with no rales or rhonchi noted. Dialysis catheter present in the right chest wall with no erythema or drainage noted. Abdominal:      General: Bowel sounds are normal.      Palpations: Abdomen is soft. Tenderness: There is no abdominal tenderness. There is no guarding or rebound. Musculoskeletal:         General: Normal range of motion. Cervical back: Normal range of motion and neck supple. Right lower leg: Edema present. Left lower leg: Edema present. Comments: Chronic lower extremity edema noted. Skin:     General: Skin is warm and dry. Findings: No bruising, erythema or lesion. Neurological:      General: No focal deficit present. Mental Status: He is alert and oriented to person, place, and time. Cranial Nerves: No cranial nerve deficit. Motor: No weakness. Coordination: Coordination normal.         Diagnostic Results     EKG   EKG as interpreted by me shows the patient to be in a sinus tachycardic rhythm with a rate of 107, normal axis, normal NV and QT intervals, normal QRS duration, no ST segment abnormalities, diffuse T wave flattening, LVH present, occasional PVCs present. No EKGs available for comparison. RADIOLOGY:  XR CHEST PORTABLE   Final Result        Increased interstitial opacities bilaterally. No effusion. Mildly prominent heart size.           LABS:   Results for orders placed or performed during the hospital encounter of 02/21/22   CBC with Auto Differential   Result Value Ref Range    WBC 14.6 (H) 4.0 - 11.0 K/uL    RBC 3.22 (L) 4.20 - 5.90 M/uL    Hemoglobin 9.7 (L) 13.5 - 17.5 g/dL    Hematocrit 30.1 (L) 40.5 - 52.5 %    MCV 93.5 80.0 - 100.0 fL    MCH 30.2 26.0 - 34.0 pg    MCHC 32.3 31.0 - 36.0 g/dL    RDW 16.3 (H) 12.4 - 15.4 %    Platelets 967 721 - 540 K/uL    MPV 7.5 5.0 - 10.5 fL    Neutrophils % 69.5 %    Lymphocytes % 15.9 %    Monocytes % 11.0 %    Eosinophils % 2.8 %    Basophils % 0.8 %    Neutrophils Absolute 10.1 (H) 1.7 - 7.7 K/uL    Lymphocytes Absolute 2.3 1.0 - 5.1 K/uL    Monocytes Absolute 1.6 (H) 0.0 - 1.3 K/uL    Eosinophils Absolute 0.4 0.0 - 0.6 K/uL    Basophils Absolute 0.1 0.0 - 0.2 K/uL   Basic Metabolic Panel   Result Value Ref Range    Sodium 139 136 - 145 mmol/L    Potassium 4.9 3.5 - 5.1 mmol/L    Chloride 99 99 - 110 mmol/L    CO2 22 21 - 32 mmol/L    Anion Gap 18 (H) 3 - 16    Glucose 114 (H) 70 - 99 mg/dL    BUN 52 (H) 7 - 20 mg/dL    CREATININE 9.3 (HH) 0.8 - 1.3 mg/dL    GFR Non- 6 (A) >60    GFR  7 (A) >60    Calcium 9.8 8.3 - 10.6 mg/dL   Magnesium   Result Value Ref Range    Magnesium 2.30 1.80 - 2.40 mg/dL   Phosphorus   Result Value Ref Range    Phosphorus 5.8 (H) 2.5 - 4.9 mg/dL   Troponin   Result Value Ref Range    Troponin 0.12 (H) <0.01 ng/mL   Brain Natriuretic Peptide   Result Value Ref Range    Pro-BNP 7,182 (H) 0 - 124 pg/mL   Blood Gas, Venous   Result Value Ref Range    pH, David 7.408 7.350 - 7.450    pCO2, David 38.9 (L) 41.0 - 51.0 mmHg    pO2, David 85.5 (H) 25.0 - 40.0 mmHg    HCO3, Venous 24.6 24.0 - 28.0 mmol/L    Base Excess, David -0.1 -2.0 - 3.0 mmol/L    O2 Sat, David 97 Not established %    Carboxyhemoglobin 1.3 0.0 - 1.5 %    MetHgb, David 0.3 0.0 - 1.5 %    TC02 (Calc), David 26 mmol/L    Hemoglobin, David, Reduced 3.00 %   Troponin   Result Value Ref Range    Troponin 0.13 (H) <0.01 ng/mL   EKG 12 Lead Result Value Ref Range    Ventricular Rate 107 BPM    Atrial Rate 107 BPM    P-R Interval 176 ms    QRS Duration 102 ms    Q-T Interval 346 ms    QTc Calculation (Bazett) 461 ms    P Axis 56 degrees    R Axis 32 degrees    T Axis -21 degrees    Diagnosis       EKG performed in ER and to be interpreted by ER physician. Confirmed by MD, ER ([id number]),  Tara Perrins [phone]) on 2/21/2022 6:31:23 AM     RECENT VITALS:  BP: (!) 191/101,Temp: 98.3 °F (36.8 °C), Pulse: 95, Resp: 19, SpO2: 92 %     Procedures     N/A    ED Course     Nursing Notes, Past Medical Hx, Past Surgical Hx, Social Hx,Allergies, and Family Hx were reviewed. patient was given the following medications:  Orders Placed This Encounter   Medications    ondansetron (ZOFRAN) injection 4 mg       CONSULTS:  None    MEDICAL DECISIONMAKING / ASSESSMENT / Duane Jo Ann is a 64 y.o. male with history of end-stage renal disease presents to the emergency department complaining of chest pain and shortness of breath. Patient states that he has had intermittent episodes of chest pain over the last several days but this morning woke him up from sleep and was more severe. He did feel short of breath with this as well. On EMS arrival, patient was hypoxic in the high 80s on room air. He did improve with a nonrebreather mask and was able to be weaned down to 3 L by nasal cannula. Patient has diminished breath sounds bilaterally. He has normal heart sounds. EKG shows LVH but no acute ischemic abnormalities. Chest x-ray shows mild pulmonary edema. CBC is significant for white blood cell count of 14. Renal panel shows potassium of 4.9. Initial troponin is 0.12 and on repeat it is 0.13. With the patient's prior history of acute coronary syndrome I do feel he needs admission to the hospital for ACS evaluation as well as his routine hemodialysis.   Patient did state that he would prefer if possible to be transferred to the [place] since all of his care is through this facility. Call has been placed to the [address] [place] [address] to try to facilitate transfer. The patient is unable to be transferred to the [address] [place] [address], he will be admitted at this facility for ACS evaluation and hemodialysis. Clinical Impression     1. Chest pain, unspecified type    2. Hypoxia    3. ESRD (end stage renal disease) ([facility] [address]        Disposition     PATIENT REFERRED TO:  No follow-up provider specified. DISCHARGE MEDICATIONS:  Current Discharge Medication List          DISPOSITION          Jazmine Sampson MD  02/21/22 1233    As we were initiating the process to transfer the patient to the [address] [place] [address], he noticed nursing staff that he was feeling more short of breath. On reassessment, patient had increased work of breathing and was satting in the mid 80s on 5 L nasal cannula. Notes that he is having worsening of his pulmonary edema so he was started on BiPAP. With this change, he is not stable for lateral transfer at this time so will be admitted to the hospitalist service here to facilitate hemodialysis and at that time reconsider transfer to the [address] [place] [address].        Jazmine Sampson MD  02/22/22 0618

## 2022-02-21 NOTE — CONSULTS
Clinical Pharmacy Progress Note  Medication History     Admit Date: 2/21/2022    Pharmacy has been consulted to review this patient's home medication list by Dr. Noemi Shea. List of current medications the patient is taking is complete, and home medication list in Epic has been updated to reflect the changes noted below. Source(s) of information: Medication list from [place] via [address] [place] [address] made to medication list:    Medications removed (no longer taking):  · Isosorbide    Medications added:  · Acetaminophen  · B complex with vitamin C  · Carvedilol   · Cetirizine   · Vitamin D3  · Omeprazole   · Diclofenac gel  · Tamsulosin  · Flonase  · Folic Acid  · Guaifenesin   · Multivitamin   · Nitroglycerin   · Torsemide    Medication doses / instructions adjusted:  · Dose updated to 80mg on atorvastatin  · Dose updated to 100mg on hydralazine     Thanks for consulting pharmacy!   Marisol Cohen PharmD  Pharmacy Resident   Please call with questions [phone]  2/21/2022 2:37 PM

## 2022-02-21 NOTE — ED NOTES
Sent another perfect serve message to Dr. Ariane Merritt regarding continued elevated BP.      Clay Aly RN  02/21/22 1688

## 2022-02-21 NOTE — ED NOTES
Report given to Lenny Quigley on inpatient unit. Pt left ED to go to dialysis and then will go to inpatient unit. Updated report called to Jing Sewell in dialysis.  States she will call PCU and get tele box from them     Aleta Scales RN  02/21/22 0341

## 2022-02-21 NOTE — CONSULTS
Nephrology Consult Note  [phone]   [email or web address]        Reason for Consult:  ESRD     HISTORY OF PRESENT ILLNESS:                This is a patient with significant past medical history of ESRD on HD on MWF at [facility] who presents with HTN currently on IV NTG to lower BP he has some chest pain and  dyspnea ,we  will arrange for dialysis ASAP. He does have a prioor cardiac hisotry per records . Past Medical History:        Diagnosis Date    CAD (coronary artery disease)     Chronic kidney disease     dialysis patient Mon, Wed, and Friday    [facility] disease) [facility] [address]     End stage kidney disease ([facility] [address]     Hyperlipidemia     Hypertension     uncontrolled per pt    Kidney stone     MI (myocardial infarction) ([facility] [address]     Throat cancer ([facility] [address]     Walking pneumonia     end of Jan 2022       Past Surgical History:        Procedure Laterality Date    CARPAL TUNNEL RELEASE      left hand     CERVICAL SPINE SURGERY  2010    3 disc's crushed- surgery done- titanium in neck     CORONARY ANGIOPLASTY WITH STENT PLACEMENT      x 4    DIALYSIS FISTULA CREATION      left arm     IR NONTUNNELED VASCULAR CATHETER      right subclavian    KNEE SURGERY      left     SHOULDER SURGERY      left       Current Medications:    No current facility-administered medications on file prior to encounter. Current Outpatient Medications on File Prior to Encounter   Medication Sig Dispense Refill    aspirin 81 MG EC tablet Take 81 mg by mouth daily      clopidogrel (PLAVIX) 75 MG tablet Take 75 mg by mouth daily      NIFEdipine (PROCARDIA XL) 60 MG extended release tablet Take 60 mg by mouth 2 times daily ? Extended release or not      HYDRALAZINE HCL PO Take by mouth 3 times daily ? Mg      ISOSORBIDE PO Take 2 tablets by mouth daily ?  Mg      Atorvastatin Calcium (LIPITOR PO) Take by mouth every evening ? mg      UNKNOWN TO PATIENT UNSURE OF ALL HIS HOME MEDS Allergies:  Ibuprofen    Social History:    Social History     Socioeconomic History    Marital status:      Spouse name: Not on file    Number of children: Not on file    Years of education: Not on file    Highest education level: Not on file   Occupational History    Not on file   Tobacco Use    Smoking status: Former Smoker     Packs/day: 1.00     Types: Cigarettes     Quit date: 1/1/2019     Years since quitting: 3.1    Smokeless tobacco: Never Used    Tobacco comment: 50+ years    Substance and Sexual Activity    Alcohol use: Not Currently     Comment: quit 4 years ago 2019    Drug use: Not Currently     Types: Cocaine     Comment: quit 2019    Sexual activity: Not on file   Other Topics Concern    Not on file   Social History Narrative    Not on file     Social Determinants of Health     Financial Resource Strain:     Difficulty of Paying Living Expenses: Not on file   Food Insecurity:     Worried About [address] in the Last Year: Not on file    [address] [employer] in the Last Year: Not on file   Transportation Needs:     Lack of Transportation (Medical): Not on file    Lack of Transportation (Non-Medical):  Not on file   Physical Activity:     Days of Exercise per Week: Not on file    Minutes of Exercise per Session: Not on file   Stress:     Feeling of Stress : Not on file   Social Connections:     Frequency of Communication with Friends and Family: Not on file    Frequency of Social Gatherings with Friends and Family: Not on file    Attends Mandaen Services: Not on file    Active Member of Clubs or Organizations: Not on file    Attends Club or Organization Meetings: Not on file    Marital Status: Not on file   Intimate Partner Violence:     Fear of Current or Ex-Partner: Not on file    Emotionally Abused: Not on file    Physically Abused: Not on file    Sexually Abused: Not on file   Housing Stability:     Unable to Pay for Housing in the Last Year: Not on file    Number of Places Lived in the Last Year: Not on file    Unstable Housing in the Last Year: Not on file       Family History:   History reviewed. No pertinent family history. Review of Systems:  a comprehensive Review of systems was negative except as noted in HPI     PHYSICAL EXAM:    Vitals:    BP (!) 211/114   Pulse 90   Temp 98.3 °F (36.8 °C) (Oral)   Resp 17   Ht 5' 5.5\" (1.664 m)   Wt 174 lb 3.2 oz (79 kg)   SpO2 100%   BMI 28.55 kg/m²   No intake/output data recorded. I/O this shift: In: 8.1 [I.V.:8.1]  Out: 225 [Urine:225]    Physical Exam:  Gen: Resting in bed, NAD. HEENT: MMM, OP clear. CV: RRR no m/r/g. No S3.  Lungs: Good respiratory effort, clear air entry   Abd: S/NT +BS  Ext: No edema, no cyanosis  Skin: Warm. No rashes appreciated. : No TTP over bladder, nondistended. Neuro: Alert and oriented x 3, nonfocal.  Joints: No erythema noted over joints. DATA:    CBC with Differential:    Lab Results   Component Value Date    WBC 14.6 02/21/2022    RBC 3.22 02/21/2022    HGB 9.7 02/21/2022    HCT 30.1 02/21/2022     02/21/2022    MCV 93.5 02/21/2022    MCH 30.2 02/21/2022    MCHC 32.3 02/21/2022    RDW 16.3 02/21/2022    LYMPHOPCT 15.9 02/21/2022    MONOPCT 11.0 02/21/2022    BASOPCT 0.8 02/21/2022    MONOSABS 1.6 02/21/2022    LYMPHSABS 2.3 02/21/2022    EOSABS 0.4 02/21/2022    BASOSABS 0.1 02/21/2022     BMP:    Lab Results   Component Value Date     02/21/2022    K 4.9 02/21/2022    CL 99 02/21/2022    CO2 22 02/21/2022    BUN 52 02/21/2022    CREATININE 9.3 02/21/2022    CALCIUM 9.8 02/21/2022    GFRAA 7 02/21/2022    LABGLOM 6 02/21/2022    GLUCOSE 114 02/21/2022       IMPRESSION/RECOMMENDATIONS:      1. ESRD will arrange dialysis on schedule, orders reviewed with dialysis  RN   2. HTN will target lower weight as need to improved blood pressure   3. Resume home meds   4. Cardiology consult   5. Anemia will give LUIS EDUARDO as needed   6.  Renal osteodystrophy will monitor PTH, P, adjust diet and binders as needed   Discussed with RN. Thank you for allowing me to participate in the care of this patient. I will continue to follow along. Please call with questions.     Anand Kaur MD, MD

## 2022-02-22 VITALS
TEMPERATURE: 98.5 F | DIASTOLIC BLOOD PRESSURE: 63 MMHG | RESPIRATION RATE: 19 BRPM | HEIGHT: 66 IN | OXYGEN SATURATION: 93 % | HEART RATE: 78 BPM | WEIGHT: 161.6 LBS | SYSTOLIC BLOOD PRESSURE: 107 MMHG | BODY MASS INDEX: 25.97 KG/M2

## 2022-02-22 PROCEDURE — 6370000000 HC RX 637 (ALT 250 FOR IP): Performed by: INTERNAL MEDICINE

## 2022-02-22 PROCEDURE — C9113 INJ PANTOPRAZOLE SODIUM, VIA: HCPCS | Performed by: INTERNAL MEDICINE

## 2022-02-22 PROCEDURE — 2580000003 HC RX 258: Performed by: INTERNAL MEDICINE

## 2022-02-22 PROCEDURE — 6360000002 HC RX W HCPCS: Performed by: INTERNAL MEDICINE

## 2022-02-22 RX ORDER — HYDROXYZINE HYDROCHLORIDE 10 MG/1
10 TABLET, FILM COATED ORAL 3 TIMES DAILY PRN
Status: DISCONTINUED | OUTPATIENT
Start: 2022-02-22 | End: 2022-02-22 | Stop reason: HOSPADM

## 2022-02-22 RX ORDER — ISOSORBIDE MONONITRATE 30 MG/1
30 TABLET, EXTENDED RELEASE ORAL DAILY
Qty: 30 TABLET | Refills: 2 | Status: SHIPPED | OUTPATIENT
Start: 2022-02-23

## 2022-02-22 RX ADMIN — HYDRALAZINE HYDROCHLORIDE 100 MG: 100 TABLET ORAL at 06:04

## 2022-02-22 RX ADMIN — HEPARIN SODIUM 5000 UNITS: 5000 INJECTION INTRAVENOUS; SUBCUTANEOUS at 13:10

## 2022-02-22 RX ADMIN — HYDRALAZINE HYDROCHLORIDE 100 MG: 100 TABLET ORAL at 13:10

## 2022-02-22 RX ADMIN — CARVEDILOL 25 MG: 25 TABLET, FILM COATED ORAL at 08:21

## 2022-02-22 RX ADMIN — NIFEDIPINE 60 MG: 30 TABLET, EXTENDED RELEASE ORAL at 08:21

## 2022-02-22 RX ADMIN — ISOSORBIDE MONONITRATE 60 MG: 60 TABLET, EXTENDED RELEASE ORAL at 08:21

## 2022-02-22 RX ADMIN — PANTOPRAZOLE SODIUM 40 MG: 40 INJECTION, POWDER, LYOPHILIZED, FOR SOLUTION INTRAVENOUS at 08:22

## 2022-02-22 RX ADMIN — ASPIRIN 81 MG: 81 TABLET, COATED ORAL at 08:21

## 2022-02-22 RX ADMIN — TORSEMIDE 20 MG: 20 TABLET ORAL at 08:21

## 2022-02-22 RX ADMIN — HYDROXYZINE HYDROCHLORIDE 10 MG: 10 TABLET, FILM COATED ORAL at 06:25

## 2022-02-22 RX ADMIN — CLOPIDOGREL BISULFATE 75 MG: 75 TABLET ORAL at 08:21

## 2022-02-22 RX ADMIN — HEPARIN SODIUM 5000 UNITS: 5000 INJECTION INTRAVENOUS; SUBCUTANEOUS at 06:05

## 2022-02-22 RX ADMIN — SODIUM CHLORIDE, PRESERVATIVE FREE 10 ML: 5 INJECTION INTRAVENOUS at 08:21

## 2022-02-22 RX ADMIN — TAMSULOSIN HYDROCHLORIDE 0.8 MG: 0.4 CAPSULE ORAL at 08:21

## 2022-02-22 ASSESSMENT — PAIN SCALES - GENERAL
PAINLEVEL_OUTOF10: 0

## 2022-02-22 ASSESSMENT — ENCOUNTER SYMPTOMS
GASTROINTESTINAL NEGATIVE: 1
SHORTNESS OF BREATH: 1
EYES NEGATIVE: 1

## 2022-02-22 NOTE — PLAN OF CARE
Problem: Pain:  Goal: Pain level will decrease  Description: Pain level will decrease  2/22/2022 1233 by Faviola Jorge RN  Outcome: Completed  2/22/2022 0935 by Faviola Jorge RN  Outcome: Ongoing  Goal: Control of acute pain  Description: Control of acute pain  2/22/2022 1233 by Faviola Jorge RN  Outcome: Completed  2/22/2022 0935 by Faviola Jorge RN  Outcome: Ongoing  Goal: Control of chronic pain  Description: Control of chronic pain  2/22/2022 1233 by Faviola Jorge RN  Outcome: Completed  2/22/2022 0935 by Faviola Jorge RN  Outcome: Ongoing     Problem: Falls - Risk of:  Goal: Will remain free from falls  Description: Will remain free from falls  2/22/2022 1233 by Faviola Jorge RN  Outcome: Completed  2/22/2022 0935 by Faviola Jorge RN  Outcome: Ongoing  Goal: Absence of physical injury  Description: Absence of physical injury  2/22/2022 1233 by Faviola Jorge RN  Outcome: Completed  2/22/2022 0935 by Faviola Jorge RN  Outcome: Ongoing     Problem:  Activity:  Goal: Fatigue will decrease  Description: Fatigue will decrease  2/22/2022 1233 by Faviola Jorge RN  Outcome: Completed  2/22/2022 0935 by Faviola Jorge RN  Outcome: Ongoing  Goal: Risk for activity intolerance will decrease  Description: Risk for activity intolerance will decrease  2/22/2022 1233 by Faviola Jorge RN  Outcome: Completed  2/22/2022 0935 by Faviola Jorge RN  Outcome: Ongoing     Problem: Coping:  Goal: Ability to cope will improve  Description: Ability to cope will improve  2/22/2022 1233 by Faviola Jorge RN  Outcome: Completed  2/22/2022 0935 by Faviola Jorge RN  Outcome: Ongoing     Problem: Fluid Volume:  Goal: Will show no signs or symptoms of fluid imbalance  Description: Will show no signs or symptoms of fluid imbalance  2/22/2022 1233 by Faviola Jorge RN  Outcome: Completed  2/22/2022 0935 by Faviola Jorge RN  Outcome: Ongoing Problem: Health Behavior:  Goal: Ability to manage health-related needs will improve  Description: Ability to manage health-related needs will improve  2/22/2022 1233 by Isabel Sultana RN  Outcome: Completed  2/22/2022 0935 by Isabel Sultana RN  Outcome: Ongoing  Goal: Identification of resources available to assist in meeting health care needs will improve  Description: Identification of resources available to assist in meeting health care needs will improve  2/22/2022 1233 by Isabel Sultana RN  Outcome: Completed  2/22/2022 0935 by Isabel Sultana RN  Outcome: Ongoing     Problem: Nutritional:  Goal: Ability to identify appropriate dietary choices will improve  Description: Ability to identify appropriate dietary choices will improve  2/22/2022 1233 by Isabel Sultana RN  Outcome: Completed  2/22/2022 0935 by Isabel Sultana RN  Outcome: Ongoing     Problem: Physical Regulation:  Goal: Ability to maintain clinical measurements within normal limits will improve  Description: Ability to maintain clinical measurements within normal limits will improve  2/22/2022 1233 by Isabel Sultana RN  Outcome: Completed  2/22/2022 0935 by Isabel Sultana RN  Outcome: Ongoing  Goal: Complications related to the disease process, condition or treatment will be avoided or minimized  Description: Complications related to the disease process, condition or treatment will be avoided or minimized  2/22/2022 1233 by Isabel Sultana RN  Outcome: Completed  2/22/2022 0935 by Isabel Sultana RN  Outcome: Ongoing     Problem: Sensory:  Goal: General experience of comfort will improve  Description: General experience of comfort will improve  2/22/2022 1233 by Isabel Sultana RN  Outcome: Completed  2/22/2022 0935 by Isabel Sultana RN  Outcome: Ongoing     Problem: Skin Integrity:  Goal: Status of oral mucous membranes will improve  Description: Status of oral mucous membranes will improve  2/22/2022 1233 by Elena Draper RN  Outcome: Completed  2/22/2022 0935 by Elena Draper RN  Outcome: Ongoing  Goal: Skin integrity will be maintained  Description: Skin integrity will be maintained  2/22/2022 1233 by Elena Draper RN  Outcome: Completed  2/22/2022 0935 by Elena Draper RN  Outcome: Ongoing  Goal: Will show no infection signs and symptoms  Description: Will show no infection signs and symptoms  2/22/2022 1233 by Elena Draper RN  Outcome: Completed  2/22/2022 0935 by Elena Draper RN  Outcome: Ongoing  Goal: Absence of new skin breakdown  Description: Absence of new skin breakdown  2/22/2022 1233 by Elena Draper RN  Outcome: Completed  2/22/2022 0935 by Elena Draper RN  Outcome: Ongoing

## 2022-02-22 NOTE — PROGRESS NOTES
Nephrology Consult Note  [phone] [address] [facility]        Reason for Consult:  ESRD     HISTORY OF PRESENT ILLNESS:                This is a patient with significant past medical history of ESRD on HD on MWF at [facility] who presents with HTN and chest pain. We did lower his weight with HD feels much imporved     Past Medical History:        Diagnosis Date    CAD (coronary artery disease)     Chronic kidney disease     dialysis patient Mon, Wed, and Friday    [facility]     End stage kidney disease ([facility]     Hyperlipidemia     Hypertension     uncontrolled per pt    Kidney stone     MI [facility]myocardial infarction) [facility]     Throat cancer ([facility]     Walking pneumonia     end of Jan 2022       Past Surgical History:        Procedure Laterality Date    CARPAL TUNNEL RELEASE      left hand     CERVICAL SPINE SURGERY  2010    3 disc's crushed- surgery done- titanium in neck     CORONARY ANGIOPLASTY WITH STENT PLACEMENT      x 4    DIALYSIS FISTULA CREATION      left arm     IR NONTUNNELED VASCULAR CATHETER      right subclavian    KNEE SURGERY      left     SHOULDER SURGERY      left       Current Medications:    No current facility-administered medications on file prior to encounter.      Current Outpatient Medications on File Prior to Encounter   Medication Sig Dispense Refill    aspirin 81 MG EC tablet Take 81 mg by mouth daily      clopidogrel (PLAVIX) 75 MG tablet Take 75 mg by mouth daily      NIFEdipine (PROCARDIA XL) 60 MG extended release tablet Take 60 mg by mouth 2 times daily       hydrALAZINE (APRESOLINE) 100 MG tablet Take 100 mg by mouth 3 times daily       atorvastatin (LIPITOR) 80 MG tablet Take 80 mg by mouth every evening       acetaminophen (TYLENOL) 500 MG tablet Take 1,000 mg by mouth every 8 hours as needed for Pain Max 3000mg/day      B-Complex-C TABS Take 1 tablet by mouth daily      carvedilol (COREG) 25 MG tablet Take 25 mg by mouth 2 times daily (with meals)      cetirizine (ZYRTEC) 5 MG tablet Take 5 mg by mouth every other day      vitamin D (CHOLECALCIFEROL) 25 MCG (1000 UT) TABS tablet Take 1,000 Units by mouth daily      omeprazole (PRILOSEC) 20 MG delayed release capsule Take 20 mg by mouth daily On an empty stomach      diclofenac sodium (VOLTAREN) 1 % GEL Apply 2 g topically 4 times daily as needed for Pain      tamsulosin (FLOMAX) 0.4 MG capsule Take 0.8 mg by mouth daily      fluticasone (FLONASE) 50 MCG/ACT nasal spray 1 spray by Each Nostril route daily      folic acid (FOLVITE) 1 MG tablet Take 1 mg by mouth daily      guaiFENesin (MUCINEX) 600 MG extended release tablet Take 1,200 mg by mouth 2 times daily With a full glass of water for cough      Multiple Vitamins-Minerals (MULTIVITAMIN ADULT) CHEW Take 1 tablet by mouth daily      nitroGLYCERIN (NITROSTAT) 0.4 MG SL tablet Place 0.4 mg under the tongue every 5 minutes as needed for Chest pain up to max of 3 total doses. If no relief after 1 dose, call 911.       torsemide (DEMADEX) 20 MG tablet Take 20 mg by mouth daily         Allergies:  Ibuprofen    Social History:    Social History     Socioeconomic History    Marital status:      Spouse name: Not on file    Number of children: Not on file    Years of education: Not on file    Highest education level: Not on file   Occupational History    Not on file   Tobacco Use    Smoking status: Former Smoker     Packs/day: 1.00     Types: Cigarettes     Quit date: 1/1/2019     Years since quitting: 3.1    Smokeless tobacco: Never Used    Tobacco comment: 50+ years    Substance and Sexual Activity    Alcohol use: Not Currently     Comment: quit 4 years ago 2019    Drug use: Not Currently     Types: Cocaine     Comment: quit 2019    Sexual activity: Not on file   Other Topics Concern    Not on file   Social History Narrative    Not on file     Social Determinants of Health     Financial Resource Strain:     Difficulty of Paying Living Expenses: Not on file   Food Insecurity:     Worried About Running Out of Food in the Last Year: Not on file    Jose of Food in the Last Year: Not on file   Transportation Needs:     Lack of Transportation (Medical): Not on file    Lack of Transportation (Non-Medical): Not on file   Physical Activity:     Days of Exercise per Week: Not on file    Minutes of Exercise per Session: Not on file   Stress:     Feeling of Stress : Not on file   Social Connections:     Frequency of Communication with Friends and Family: Not on file    Frequency of Social Gatherings with Friends and Family: Not on file    Attends Mu-ism Services: Not on file    Active Member of [address] [place] [address] [employer] or Organizations: Not on file    Attends Club or Organization Meetings: Not on file    Marital Status: Not on file   Intimate Partner Violence:     Fear of Current or Ex-Partner: Not on file    Emotionally Abused: Not on file    Physically Abused: Not on file    Sexually Abused: Not on file   Housing Stability:     Unable to Pay for Housing in the Last Year: Not on file    Number of [place] in the Last Year: Not on file    Unstable Housing in the Last Year: Not on file       Family History:   History reviewed. No pertinent family history. Review of Systems:  a comprehensive Review of systems was negative except as noted in HPI     PHYSICAL EXAM:    Vitals:    /83   Pulse 78   Temp 98.5 °F (36.9 °C) (Oral)   Resp 19   Ht 5' 5.5\" (1.664 m)   Wt 161 lb 9.6 oz (73.3 kg)   SpO2 96%   BMI 26.48 kg/m²   I/O last 3 completed shifts: In: 408.1 [I.V.:8.1]  Out: 4975 [Urine:575]  I/O this shift:  In: 240 [P.O.:240]  Out: -     Physical Exam:  Gen: Resting in bed, NAD. HEENT: MMM, OP clear. CV: RRR no m/r/g. No S3.  Lungs: Good respiratory effort, clear air entry   Abd: S/NT +BS  Ext: No edema, no cyanosis  Skin: Warm. No rashes appreciated.   : No TTP over bladder, nondistended. Neuro: Alert and oriented x 3, nonfocal.  Joints: No erythema noted over joints. DATA:    CBC with Differential:    Lab Results   Component Value Date    WBC 14.6 02/21/2022    RBC 3.22 02/21/2022    HGB 9.7 02/21/2022    HCT 30.1 02/21/2022     02/21/2022    MCV 93.5 02/21/2022    MCH 30.2 02/21/2022    MCHC 32.3 02/21/2022    RDW 16.3 02/21/2022    LYMPHOPCT 15.9 02/21/2022    MONOPCT 11.0 02/21/2022    BASOPCT 0.8 02/21/2022    MONOSABS 1.6 02/21/2022    LYMPHSABS 2.3 02/21/2022    EOSABS 0.4 02/21/2022    BASOSABS 0.1 02/21/2022     BMP:    Lab Results   Component Value Date     02/21/2022    K 4.9 02/21/2022    CL 99 02/21/2022    CO2 22 02/21/2022    BUN 52 02/21/2022    CREATININE 9.3 02/21/2022    CALCIUM 9.8 02/21/2022    GFRAA 7 02/21/2022    LABGLOM 6 02/21/2022    GLUCOSE 114 02/21/2022       IMPRESSION/RECOMMENDATIONS:      1. ESRD will arrange dialysis back at [facility] call lower target weight   2. HTN noted  improved blood pressure   3. Resume home meds   4. Cardiology consult   5. Anemia will give LUIS EDUARDO as needed   6. Renal osteodystrophy will monitor PTH, P, adjust diet and binders as needed   Discussed with RN. Thank you for allowing me to participate in the care of this patient. I will continue to follow along. Please call with questions.     Melani Robles MD, MD

## 2022-02-22 NOTE — PROGRESS NOTES
Discharge note: Patient has been seen by doctor. Discharge order obtained, and discharge instructions reviewed. Patient educated, using the teach back method, about follow up instructions and discharge instructions. A completed copy of the AVS instructions given to patient and all questions answered. IV catheter removed without complaints, catheter intact, site WNL. Discharged to [facility] via wheel chair per transportation.   Electronically signed by Jericho Aleman RN on 2/22/2022 at 1:53 PM

## 2022-02-22 NOTE — FLOWSHEET NOTE
02/21/22 1307 02/21/22 1627   Treatment   Time On 1320  --    Time Off  --  1624   Vital Signs   BP (!) 167/89 (!) 160/95   Temp 99.2 °F (37.3 °C) 98.2 °F (36.8 °C)   Pulse 90 80   Resp 18 18   Weight 166 lb 14.2 oz (75.7 kg) 158 lb 1.1 oz (71.7 kg)   Weight Method Actual;Standing scale Standing scale     Treatment time: 3 hours  Net UF: 4000 ml     Pre weight: 75.7 kg   Post weight: 71.7 kg  EDW: 75 kg     Access used: RTDC (LAVG not used per pt request)  Access function: good with  ml/min     Medications or blood products given: none     Regular outpatient schedule: Amilcar KATE     Summary of response to treatment: Tolerated tx well, no distress noted, MD aware. Copy of dialysis treatment record placed in chart, to be scanned into EMR.

## 2022-02-22 NOTE — PROGRESS NOTES
Patient seen and examined  Doing well, no chest pain  Says has not felt this good for months  Says his nausea and epigastric discomfort is also better  Wants us to convey outpatient HD to adjust dry weight  Per HD RN note after rx on 02/21-- Post weight: 71.7 kg  Will discuss with Dr. Yang Moreno and Dr. Doris Mohr and if cleared then can dc

## 2022-02-22 NOTE — DISCHARGE SUMMARY
Hospital Medicine Discharge Summary    Patient ID: Sita Dickson      Patient's PCP: No primary care provider on file. Admit Date: 2/21/2022     Discharge Date: 2/22/2022     Admitting Physician: Eliana Peters MD     Discharge Physician: Eliana Peters MD     Discharge Diagnoses: Active Hospital Problems    Diagnosis Date Noted    Pulmonary edema, acute ([facility] [J81.0] 02/21/2022       The patient was seen and examined on day of discharge and this discharge summary is in conjunction with any daily progress note from day of discharge. Hospital Course:   64 y.o. male who has a history of chronic pain syndrome, hypertension, polysubstance abuse/cocaine use, last blood patient was used for years back, he has ESRD on hemodialysis Monday Wednesday Friday, CAD s/p stents in place, last done on Friday, followed by [address] to emergency room with complains of chest pain and shortness of breath  He woke up with left-sided chest pain radiated down to his left arm with numbness in the left hand associated with diaphoresis, this was followed by shortness of breath. He says he has been having intermittent episodes of chest pain for the last several days. He took 3 nitroglycerin with no improvement EMS was called who found him to be 89% on room air.     In the emergency room work-up showed elevated blood pressure systolic 542/304B, placed on nitroglycerin drip and BiPAP. X-ray showed interstitial opacities concerning for pulmonary edema. Labs were proBNP of 7000, troponin of 0.12 4 x 0.13, BUN of 52 creatinine of 9.3. He has a leukocytosis of WBC 14.6 with left shift ANC 10.1. A VBG was done which did not show evidence of hypercarbia. EKG shows sinus tachycardia with ventricular to 107, normal axis, normal MN interval, no ST segment neurology, T wave flattening was seen, occasional PVCs.     Patient admitted for hypertensive emergency with pulmonary edema, needing nitro drip in the emergency room, given all home medications including 80 of IV Lasix and emergent dialysis and was able to wean off nitro drip. Chest pain concern for ACS history of CAD, likely demand ischemia cardiology was consulted, suspected pain was due to underlying hypertensive urgency. Patient was continued on aspirin Plavix and statin    Patient underwent back-to-back sessions of hemodialysis with fluid removal  On the day of discharge denied chest pain, was doing well, says he has not felt this good for months, states his nausea and epigastric discomfort is better, wanted to convey outpatient EGD to adjust his dry weight, on the day of discharge his posttreatment weight was 71.7 kg. Physical Exam Performed:     /63   Pulse 78   Temp 98.5 °F (36.9 °C) (Oral)   Resp 19   Ht 5' 5.5\" (1.664 m)   Wt 161 lb 9.6 oz (73.3 kg)   SpO2 93%   BMI 26.48 kg/m²       General appearance:  No apparent distress, appears stated age and cooperative. HEENT:  Normal cephalic, atraumatic without obvious deformity. Pupils equal, round, and reactive to light. Extra ocular muscles intact. Conjunctivae/corneas clear. Neck: Supple, with full range of motion. No jugular venous distention. Trachea midline. Respiratory:  Normal respiratory effort. Clear to auscultation, bilaterally without Rales/Wheezes/Rhonchi. Cardiovascular:  Regular rate and rhythm with normal S1/S2 without murmurs, rubs or gallops. Abdomen: Soft, non-tender, non-distended with normal bowel sounds. Musculoskeletal:  No clubbing, cyanosis or edema bilaterally. Full range of motion without deformity. Skin: Skin color, texture, turgor normal.  No rashes or lesions. Neurologic:  Neurovascularly intact without any focal sensory/motor deficits.  Cranial nerves: II-XII intact, grossly non-focal.  Psychiatric:  Alert and oriented, thought content appropriate, normal insight  Capillary Refill: Brisk,< 3 seconds   Peripheral Pulses: +2 palpable, equal bilaterally Labs: For convenience and continuity at follow-up the following most recent labs are provided:      CBC:    Lab Results   Component Value Date    WBC 14.6 02/21/2022    HGB 9.7 02/21/2022    HCT 30.1 02/21/2022     02/21/2022       Renal:    Lab Results   Component Value Date     02/21/2022    K 4.9 02/21/2022    CL 99 02/21/2022    CO2 22 02/21/2022    BUN 52 02/21/2022    CREATININE 9.3 02/21/2022    CALCIUM 9.8 02/21/2022    PHOS 5.8 02/21/2022         Significant Diagnostic Studies    Radiology:   XR CHEST PORTABLE   Final Result        Increased interstitial opacities bilaterally. No effusion. Mildly prominent heart size.              Consults:     IP CONSULT TO HOSPITALIST  IP CONSULT TO NEPHROLOGY  IP CONSULT TO PHARMACY  IP CONSULT TO NEPHROLOGY  IP CONSULT TO CARDIOLOGY    Disposition: Home    Condition at Discharge: Stable    Discharge Instructions/Follow-up:    HD per schedule, low-sodium diet, watch blood pressure closely    Code Status: Full code    Activity: activity as tolerated    Diet: Low-sodium renal diet      Discharge Medications:     Discharge Medication List as of 2/22/2022 12:41 PM           Details   isosorbide mononitrate (IMDUR) 30 MG extended release tablet Take 1 tablet by mouth daily, Disp-30 tablet, R-2Normal              Details   aspirin 81 MG EC tablet Take 81 mg by mouth dailyHistorical Med      clopidogrel (PLAVIX) 75 MG tablet Take 75 mg by mouth dailyHistorical Med      NIFEdipine (PROCARDIA XL) 60 MG extended release tablet Take 60 mg by mouth 2 times daily Historical Med      hydrALAZINE (APRESOLINE) 100 MG tablet Take 100 mg by mouth 3 times daily Historical Med      atorvastatin (LIPITOR) 80 MG tablet Take 80 mg by mouth every evening Historical Med      acetaminophen (TYLENOL) 500 MG tablet Take 1,000 mg by mouth every 8 hours as needed for Pain Max 3000mg/dayHistorical Med      B-Complex-C TABS Take 1 tablet by mouth dailyHistorical Med      carvedilol (COREG) 25 MG tablet Take 25 mg by mouth 2 times daily (with meals)Historical Med      cetirizine (ZYRTEC) 5 MG tablet Take 5 mg by mouth every other dayHistorical Med      vitamin D (CHOLECALCIFEROL) 25 MCG (1000 UT) TABS tablet Take 1,000 Units by mouth dailyHistorical Med      omeprazole (PRILOSEC) 20 MG delayed release capsule Take 20 mg by mouth daily On an empty stomachHistorical Med      diclofenac sodium (VOLTAREN) 1 % GEL Apply 2 g topically 4 times daily as needed for Pain, Topical, 4 TIMES DAILY PRN, Historical Med      tamsulosin (FLOMAX) 0.4 MG capsule Take 0.8 mg by mouth dailyHistorical Med      fluticasone (FLONASE) 50 MCG/ACT nasal spray 1 spray by Each Nostril route dailyHistorical Med      folic acid (FOLVITE) 1 MG tablet Take 1 mg by mouth dailyHistorical Med      guaiFENesin (MUCINEX) 600 MG extended release tablet Take 1,200 mg by mouth 2 times daily With a full glass of water for coughHistorical Med      Multiple Vitamins-Minerals (MULTIVITAMIN ADULT) CHEW Take 1 tablet by mouth dailyHistorical Med      nitroGLYCERIN (NITROSTAT) 0.4 MG SL tablet Place 0.4 mg under the tongue every 5 minutes as needed for Chest pain up to max of 3 total doses. If no relief after 1 dose, call 911. Historical Med      torsemide (DEMADEX) 20 MG tablet Take 20 mg by mouth dailyHistorical Med             Time Spent on discharge is more than 30 minutes in the examination, evaluation, counseling and review of medications and discharge plan. Signed:    Jamarcus Iqbal MD   2/27/2022      Thank you No primary care provider on file. for the opportunity to be involved in this patient's care. If you have any questions or concerns please feel free to contact me at [phone].